# Patient Record
Sex: FEMALE | Race: WHITE
[De-identification: names, ages, dates, MRNs, and addresses within clinical notes are randomized per-mention and may not be internally consistent; named-entity substitution may affect disease eponyms.]

---

## 2019-04-27 ENCOUNTER — HOSPITAL ENCOUNTER (INPATIENT)
Dept: HOSPITAL 74 - YASAS | Age: 24
LOS: 3 days | Discharge: HOME | End: 2019-04-30
Attending: SURGERY | Admitting: SURGERY
Payer: COMMERCIAL

## 2019-04-27 VITALS — BODY MASS INDEX: 33.3 KG/M2

## 2019-04-27 DIAGNOSIS — E86.0: ICD-10-CM

## 2019-04-27 DIAGNOSIS — F10.230: Primary | ICD-10-CM

## 2019-04-27 PROCEDURE — HZ2ZZZZ DETOXIFICATION SERVICES FOR SUBSTANCE ABUSE TREATMENT: ICD-10-PCS | Performed by: SURGERY

## 2019-04-27 RX ADMIN — Medication PRN MG: at 23:18

## 2019-04-27 RX ADMIN — Medication SCH MG: at 23:18

## 2019-04-27 NOTE — HP
CIWA Score


Nausea/Vomitin-Mild Nausea/No Vomiting


Muscle Tremors: 3


Anxiety: 4-Mod. Anxious/Guarded


Agitation: 0-Normal Activity


Paroxysmal Sweats: No Perspiration


Orientation: 1-Uncertain about Date


Tacttile Disturbances: 1-Very Mild Itch/Numbness


Auditory Disturbances: 1-Very Mild


Visual Disturbances: 1-Very Mild Sensitivity


Headache: 2-Mild


CIWA-Ar Total Score: 14





- Admission Criteria


[facility] Guidelines: Admission for Medically Managed Detox: 


Requires at least one of the followin. CIWA greater than 12


2. Seizures within the past 24 hours


3. Delirium tremens within the past 24 hours


4. Hallucinations within the past 24 hours


5. Acute intervention needed for co  occurring medical disorder


6. Acute intervention needed for co  occurring psychiatric disorder


7. Severe withdrawal that cannot be handled at a lower level of care (continued


    vomiting, continued diarrhea, abnormal vital signs) requiring intravenous


    medication and/or fluids


8. Pregnancy





Patient presents the following: CIWA greater than 12


Admission Criteria Met: Admission criteria met





Admission ROS S





- HPI


Chief Complaint: 


I want to stop drinking, it's ruining my life


Allergies/Adverse Reactions: 


 Allergies











Allergy/AdvReac Type Severity Reaction Status Date / Time


 


No Known Allergies Allergy   Verified 19 10:53











History of Present Illness: 


22 yo woman here for detox from alcohol - states she is losing control, 

sometimes cannot go to work as too hungover, binge drinks several times a week, 

has blackouts but no seizures.  She does not drink first thing in the morning 

but has been unable to not drink for more than two days .  She was seen in [facility] ED yesterday because of depression which was thought to be related 

to alcohol use - she was told to detox then return for psych treatment/

evaluation.  She is not currently suicidal.  


Exam Limitations: No Limitations





- Ebola screening


Have you traveled outside of the country in the last 21 days: No


Have you had contact with anyone from an Ebola affected area: No





- Review of Systems


Constitutional: Loss of Appetite, Malaise, Changes in sleep


EENT: reports: No Symptoms Reported


Respiratory: reports: No Symptoms reported


Cardiac: reports: No Symptoms Reported


GI: reports: Nausea, Poor Fluid Intake, Abdominal cramping, Other (LLQ 

discomfort)


: reports: Frequency


Musculoskeletal: reports: No Symptoms Reported


Integumentary: reports: Dryness


Neuro: reports: Headache, Tremors


Endocrine: reports: No Symptoms Reported


Hematology: reports: No Symptoms Reported


Psychiatric: reports: Judgement Intact, Mood/Affect Appropiate, Orientated x3, 

Anxious


Other Systems: Reviewed and Negative





Patient History





- Patient Medical History


Hx Asthma: No


Hx Chronic Obstructive Pulmonary Disease (COPD): No


Hx Cancer: No


Hx Congestive Heart Failure: No


Hx Hypertension: No


Hx Hypercholesterolemia: No


Hx Pacemaker: No


HX Cerebrovascular Accident: No


Hx Seizures: No


Hx Diabetes: No


Hx Gastrointestinal Disorders: No


Hx Liver Disease: No


Hx Genitourinary Disorders: Yes (? PCOS)


Hx Sexually Transmitted Disorders: No


Hx Renal Disease (ESRD): No


Hx Thyroid Disease: No


Hx Human Immunodeficiency Virus (HIV): No


Hx Hepatitis C: No


Hx Depression: Yes (no meds, has seen psych in past, never hospitalized)


Hx Suicide Attempt: No


Hx Bipolar Disorder: No


Hx Schizophrenia: No





- Patient Surgical History


Past Surgical History: No





- PPD History


Previous Implant?: No


Implanted On Prior R Admission?: No


PPD to be Administered?: Yes





- Reproductive History


Patient is a Female of Child Bearing Age (11 -55 yrs old): Yes





- Smoking Cessation


Smoking history: Former smoker


Have you smoked in the past 12 months: No


Initiated information on smoking cessation: No





- Substance & Tx. History


Hx Alcohol Use: Yes


Hx Substance Use: No


Substance Use Type: Alcohol


Hx Substance Use Treatment: No





- Substances abused


  ** Alcohol


Substance route: Oral


Frequency: 3-6 times per week


Amount used: five cups of abdullahi


Age of first use: 12


Date of last use: 19





Family Disease History





- Family Disease History


Family Disease History: Other: Father (living - no contact), Mother (living), 

Brother (two - living ), Sister (two - living), Son (age four - healthy)





Admission Physical Exam [facility]





- Vital Signs


Vital Signs: 


 Vital Signs - 24 hr











  19





  10:53 11:12


 


Temperature 98.2 F 98.2 F


 


Pulse Rate 68 68


 


Respiratory 18 18





Rate  


 


Blood Pressure 122/72 122/72














- Physical


General Appearance: Yes: Nourished, Appropriately Dressed, Moderate Distress, 

Tremorous, Anxious


HEENTM: Yes: EOMI, Hearing grossly Normal, Normocephalic, Normal Voice, Pharynx 

Normal


Respiratory: Yes: Normal Breath Sounds, No Respiratory Distress


Neck: Yes: Within Normal Limits


Breast: Yes: Breast Exam Deferred


Cardiology: Yes: Regular Rhythm, Regular Rate


Abdominal: Yes: Soft, Tenderness, Other (mild LLQ discomfort (no rebound))


Genitourinary: Yes: Frequency


Back: Yes: Normal Inspection


Musculoskeletal: Yes: full range of Motion, Gait Steady


Extremities: Yes: Normal Inspection, Normal Range of Motion, Non-Tender, Tremors


Neurological: Yes: Alert, Motor Strength 5/5, Normal Mood/Affect, Normal 

Response


Integumentary: Yes: Normal Color, Dry, Warm


Lymphatic: Yes: Within Normal Limits





- Diagnostic


(1) Alcohol dependence


Current Visit: Yes   Status: Acute   


Qualifiers: 


   Substance use status: uncomplicated   Qualified Code(s): F10.20 - Alcohol 

dependence, uncomplicated   





(2) Dehydration


Current Visit: Yes   Status: Chronic   





Cleared for Admission [facility]





- Detox or Rehab


[facility] Level of Care: Medically Managed


Detox Regimen/Protocol: Librium





Breathalyzer





- Breathalyzer


Breathalyzer: 0





POC Urine pregnancy test





- Test device


Pregnancy test lot number: [id number]


Expiration date: 20





- Control


Pregnancy test control: Yes





- Result


Urine Pregnancy Test Results: Negative - NO line present





Urine Drug Screen





- Test Device


Lot number: [id number]


Expiration date: 20





- Control


Is test valid?: Yes





- Results


Drug screen NEGATIVE: Yes





Inpatient Rehab Admission





- Rehab Decision to Admit


Inpatient rehab admission?: No

## 2019-04-28 LAB
ALBUMIN SERPL-MCNC: 3.5 G/DL (ref 3.4–5)
ALP SERPL-CCNC: 64 U/L (ref 45–117)
ALT SERPL-CCNC: 25 U/L (ref 13–61)
ANION GAP SERPL CALC-SCNC: 6 MMOL/L (ref 8–16)
AST SERPL-CCNC: 16 U/L (ref 15–37)
BILIRUB SERPL-MCNC: 0.5 MG/DL (ref 0.2–1)
BUN SERPL-MCNC: 6 MG/DL (ref 7–18)
CALCIUM SERPL-MCNC: 9.1 MG/DL (ref 8.5–10.1)
CHLORIDE SERPL-SCNC: 104 MMOL/L (ref 98–107)
CO2 SERPL-SCNC: 28 MMOL/L (ref 21–32)
CREAT SERPL-MCNC: 0.6 MG/DL (ref 0.55–1.3)
DEPRECATED RDW RBC AUTO: 13.8 % (ref 11.6–15.6)
GLUCOSE SERPL-MCNC: 85 MG/DL (ref 74–106)
HCT VFR BLD CALC: 37.7 % (ref 32.4–45.2)
HGB BLD-MCNC: 12.7 GM/DL (ref 10.7–15.3)
MCH RBC QN AUTO: 30.9 PG (ref 25.7–33.7)
MCHC RBC AUTO-ENTMCNC: 33.6 G/DL (ref 32–36)
MCV RBC: 92 FL (ref 80–96)
PLATELET # BLD AUTO: 203 K/MM3 (ref 134–434)
PMV BLD: 8.8 FL (ref 7.5–11.1)
POTASSIUM SERPLBLD-SCNC: 3.9 MMOL/L (ref 3.5–5.1)
PROT SERPL-MCNC: 7 G/DL (ref 6.4–8.2)
RBC # BLD AUTO: 4.1 M/MM3 (ref 3.6–5.2)
SODIUM SERPL-SCNC: 139 MMOL/L (ref 136–145)
WBC # BLD AUTO: 4.8 K/MM3 (ref 4–10)

## 2019-04-28 RX ADMIN — Medication PRN MG: at 22:51

## 2019-04-28 RX ADMIN — Medication SCH TAB: at 14:25

## 2019-04-28 RX ADMIN — Medication SCH MG: at 22:00

## 2019-04-28 NOTE — PN
S CIWA





- CIWA Score


Nausea/Vomitin-No Nausea/No Vomiting


Muscle Tremors: 3


Anxiety: 3


Agitation: 2


Paroxysmal Sweats: 3


Orientation: 0-Oriented


Tacttile Disturbances: 0-None


Auditory Disturbances: 0-None


Visual Disturbances: 0-None


Headache: 0-None Present


CIWA-Ar Total Score: 11





[facility] Progress Note (SOAP)


Subjective: 





patient c/o chills, sweating and anxiety


Objective: 





19 13:33


 Laboratory Tests











  19





  07:00 07:00 07:00


 


WBC  4.8  


 


RBC  4.10  


 


Hgb  12.7  


 


Hct  37.7  


 


MCV  92.0  


 


MCH  30.9  


 


MCHC  33.6  


 


RDW  13.8  


 


Plt Count  203  


 


MPV  8.8  


 


Sodium   139 


 


Potassium   3.9 


 


Chloride   104 


 


Carbon Dioxide   28 


 


Anion Gap   6 L 


 


BUN   6 L 


 


Creatinine   0.6 


 


Creat Clearance w eGFR   123.89 


 


Random Glucose   85 


 


Calcium   9.1 


 


Total Bilirubin   0.5 


 


AST   16 


 


ALT   25 


 


Alkaline Phosphatase   64 


 


Total Protein   7.0 


 


Albumin   3.5 


 


RPR Titer    Nonreactive








 Vital Signs











Temperature  97.5 F L  19 09:41


 


Pulse Rate  68   19 09:41


 


Respiratory Rate  18   19 09:41


 


Blood Pressure  105/59 L  19 09:41


 


O2 Sat by Pulse Oximetry (%)      








pe:





alert and oriented x 3


skin warm, + facial moisture


ext + tremors


amb ad ron


anxious


Assessment: 





19 13:34


withdrawal sx


Plan: 





continue detox


monitor

## 2019-04-29 RX ADMIN — Medication SCH MG: at 22:02

## 2019-04-29 RX ADMIN — Medication SCH TAB: at 10:51

## 2019-04-29 NOTE — PN
[facility] CIWA





- CIWA Score


Nausea/Vomitin-No Nausea/No Vomiting


Muscle Tremors: 3


Anxiety: 2


Agitation: 3


Paroxysmal Sweats: 3


Orientation: 0-Oriented


Tacttile Disturbances: 0-None


Auditory Disturbances: 0-None


Visual Disturbances: 0-None


Headache: 0-None Present


CIWA-Ar Total Score: 11





[facility] Progress Note (SOAP)


Subjective: 





chills


anxiety


body aches


Objective: 





19 12:19


 Vital Signs











Temperature  97.5 F L  19 11:46


 


Pulse Rate  69   19 11:46


 


Respiratory Rate  18   19 11:46


 


Blood Pressure  105/74   19 11:46


 


O2 Sat by Pulse Oximetry (%)      








 Laboratory Tests











  19





  07:00 07:00 07:00


 


WBC  4.8  


 


RBC  4.10  


 


Hgb  12.7  


 


Hct  37.7  


 


MCV  92.0  


 


MCH  30.9  


 


MCHC  33.6  


 


RDW  13.8  


 


Plt Count  203  


 


MPV  8.8  


 


Sodium   139 


 


Potassium   3.9 


 


Chloride   104 


 


Carbon Dioxide   28 


 


Anion Gap   6 L 


 


BUN   6 L 


 


Creatinine   0.6 


 


Creat Clearance w eGFR   123.89 


 


Random Glucose   85 


 


Calcium   9.1 


 


Total Bilirubin   0.5 


 


AST   16 


 


ALT   25 


 


Alkaline Phosphatase   64 


 


Total Protein   7.0 


 


Albumin   3.5 


 


RPR Titer    Nonreactive








[id number]


ambulating


no acute distress


Assessment: 





19 12:19


withdrawal sx


Plan: 





continue detox


increase fluids

## 2019-04-30 VITALS — TEMPERATURE: 97.5 F

## 2019-04-30 VITALS — HEART RATE: 62 BPM | DIASTOLIC BLOOD PRESSURE: 61 MMHG | SYSTOLIC BLOOD PRESSURE: 112 MMHG

## 2019-04-30 RX ADMIN — Medication SCH TAB: at 10:16

## 2019-04-30 NOTE — DS
[facility] Detox Discharge Summary


Admission Date: 


04/27/19





Discharge Date: 04/30/19





- History


Present History: Alcohol Dependence





- Physical Exam Results


Vital Signs: 


 Vital Signs











Temperature  97.5 F L  04/30/19 06:00


 


Pulse Rate  64   04/30/19 06:00


 


Respiratory Rate  18   04/30/19 06:00


 


Blood Pressure  109/55 L  04/30/19 06:00


 


O2 Sat by Pulse Oximetry (%)      














- Treatment


Hospital Course: Detox Protocol Followed, Detoxed Safely, Responded well, 

Discharged Condition Good, Rehab Referral Accepted





- Medication


Discharge Medications: 


Ambulatory Orders





NK [No Known Home Medication]  04/27/19 











- Diagnosis


(1) Alcohol dependence


Current Visit: Yes   Status: Chronic   


Qualifiers: 


   Substance use status: uncomplicated   Qualified Code(s): F10.20 - Alcohol 

dependence, uncomplicated   





(2) Dehydration


Current Visit: Yes   Status: Chronic   





- AMA


Did Patient Leave Against Medical Advice: No (referred to [facility] center. )